# Patient Record
Sex: FEMALE | Race: WHITE | NOT HISPANIC OR LATINO | RURAL
[De-identification: names, ages, dates, MRNs, and addresses within clinical notes are randomized per-mention and may not be internally consistent; named-entity substitution may affect disease eponyms.]

---

## 2022-02-09 ENCOUNTER — CLAIMS CREATED FROM THE CLAIM WINDOW (OUTPATIENT)
Dept: RURAL CLINIC 8 | Facility: CLINIC | Age: 52
End: 2022-02-09
Payer: MEDICARE

## 2022-02-09 ENCOUNTER — OFFICE VISIT (OUTPATIENT)
Dept: RURAL CLINIC 8 | Facility: CLINIC | Age: 52
End: 2022-02-09
Payer: MEDICARE

## 2022-02-09 DIAGNOSIS — F11.90 CHRONIC NARCOTIC USE: ICD-10-CM

## 2022-02-09 DIAGNOSIS — K59.09 CHRONIC CONSTIPATION: ICD-10-CM

## 2022-02-09 DIAGNOSIS — R10.9 ABDOMINAL CRAMPING: ICD-10-CM

## 2022-02-09 DIAGNOSIS — Z72.0 TOBACCO ABUSE: ICD-10-CM

## 2022-02-09 DIAGNOSIS — I10 HTN (HYPERTENSION), BENIGN: ICD-10-CM

## 2022-02-09 DIAGNOSIS — G89.29 OTHER CHRONIC PAIN: ICD-10-CM

## 2022-02-09 DIAGNOSIS — Z98.890 HISTORY OF BLADDER SURGERY: ICD-10-CM

## 2022-02-09 DIAGNOSIS — E78.2 MIXED HYPERLIPIDEMIA: ICD-10-CM

## 2022-02-09 DIAGNOSIS — M54.9 DORSALGIA, UNSPECIFIED: ICD-10-CM

## 2022-02-09 PROBLEM — 267434003: Status: ACTIVE | Noted: 2022-02-09

## 2022-02-09 PROBLEM — 10725009: Status: ACTIVE | Noted: 2022-02-09

## 2022-02-09 PROBLEM — 82423001: Status: ACTIVE | Noted: 2022-02-09

## 2022-02-09 PROCEDURE — 99204 OFFICE O/P NEW MOD 45 MIN: CPT | Performed by: INTERNAL MEDICINE

## 2022-02-09 RX ORDER — TOPIRAMATE 100 MG/1
1 TABLET TABLET, FILM COATED ORAL ONCE A DAY
Status: DISCONTINUED | COMMUNITY

## 2022-02-09 RX ORDER — SEMAGLUTIDE 1.34 MG/ML
AS DIRECTED INJECTION, SOLUTION SUBCUTANEOUS
Status: ACTIVE | COMMUNITY

## 2022-02-09 RX ORDER — VILAZODONE HYDROCHLORIDE 40 MG/1
1 TABLET WITH FOOD TABLET ORAL ONCE A DAY
Status: ACTIVE | COMMUNITY

## 2022-02-09 RX ORDER — ATORVASTATIN CALCIUM 40 MG/1
1 TABLET TABLET, FILM COATED ORAL ONCE A DAY
Status: ACTIVE | COMMUNITY

## 2022-02-09 RX ORDER — BUTALBITAL, ACETAMINOPHEN AND CAFFEINE 50; 325; 40 MG/1; MG/1; MG/1
1 CAPSULE AS NEEDED CAPSULE ORAL
Status: ACTIVE | COMMUNITY

## 2022-02-09 RX ORDER — LEVOTHYROXINE SODIUM 0.05 MG/1
1 TABLET IN THE MORNING ON AN EMPTY STOMACH TABLET ORAL ONCE A DAY
Status: ACTIVE | COMMUNITY

## 2022-02-09 RX ORDER — PROPRANOLOL HYDROCHLORIDE 20 MG/1
1 TABLET TABLET ORAL ONCE A DAY
Status: ACTIVE | COMMUNITY

## 2022-02-09 RX ORDER — ONDANSETRON HYDROCHLORIDE 4 MG/1
1 TABLET TABLET, FILM COATED ORAL ONCE A DAY
Status: ACTIVE | COMMUNITY

## 2022-02-09 RX ORDER — CELECOXIB 100 MG/1
1 CAPSULE WITH FOOD CAPSULE ORAL ONCE A DAY
Status: ACTIVE | COMMUNITY

## 2022-02-09 RX ORDER — ALPRAZOLAM 0.5 MG/1
1 TABLET TABLET ORAL TWICE A DAY
Status: ACTIVE | COMMUNITY

## 2022-02-09 RX ORDER — ESTRADIOL 1 MG/1
1 TABLET TABLET ORAL ONCE A DAY
Status: ACTIVE | COMMUNITY

## 2022-02-09 RX ORDER — LACTULOSE 10 G/15ML
15 ML SOLUTION ORAL ONCE A DAY
Status: ACTIVE | COMMUNITY

## 2022-02-09 RX ORDER — POTASSIUM CHLORIDE 1500 MG/1
1 TABLET WITH FOOD TABLET, FILM COATED, EXTENDED RELEASE ORAL ONCE A DAY
Status: ACTIVE | COMMUNITY

## 2022-02-09 RX ORDER — IBUPROFEN 800 MG/1
1 TABLET WITH FOOD OR MILK AS NEEDED TABLET ORAL THREE TIMES A DAY
Status: DISCONTINUED | COMMUNITY

## 2022-02-09 RX ORDER — NYSTATIN 100000 [USP'U]/G
1 APPLICATION POWDER TOPICAL TWICE A DAY
Status: DISCONTINUED | COMMUNITY

## 2022-02-09 RX ORDER — GABAPENTIN 600 MG/1
1 TABLET TABLET, FILM COATED ORAL ONCE A DAY
Status: ACTIVE | COMMUNITY

## 2022-02-09 RX ORDER — BUPRENORPHINE HCL 8 MG/1
1 TABLET UNDER THE TONGUE AND ALLOW TO DISSOLVE TABLET SUBLINGUAL ONCE A DAY
Status: ACTIVE | COMMUNITY

## 2022-02-09 RX ORDER — LINACLOTIDE 290 UG/1
1 CAPSULE AT LEAST 30 MINUTES BEFORE THE FIRST MEAL OF THE DAY ON AN EMPTY STOMACH CAPSULE, GELATIN COATED ORAL ONCE A DAY
Status: ACTIVE | COMMUNITY

## 2022-02-09 RX ORDER — MODAFINIL 100 MG/1
1 TABLET IN THE MORNING TABLET ORAL ONCE A DAY
Status: ACTIVE | COMMUNITY

## 2022-02-09 RX ORDER — ALBUTEROL SULFATE 90 UG/1
1 PUFF AS NEEDED AEROSOL, METERED RESPIRATORY (INHALATION)
Status: ACTIVE | COMMUNITY

## 2022-02-09 RX ORDER — BISACODYL 5 MG/1
3 TABLETS TABLET, DELAYED RELEASE ORAL ONCE A DAY
Qty: 90 TABLET | Refills: 5 | OUTPATIENT

## 2022-02-09 RX ORDER — POLYETHYLENE GLYCOL 3350 17 G
1 PACKET MIXED WITH 8 OUNCES OF FLUID POWDER IN PACKET (EA) ORAL ONCE A DAY
Qty: 90 | Refills: 3 | OUTPATIENT

## 2022-02-09 NOTE — HPI-TODAY'S VISIT:
Patient comes in referral from Dr. Caceres.  A copy of this consultation will be sent to the referring physician.  She has history of opiate abuse disorder and currently is on Suboxone.  She says that she has been constipated for longer than she could remember.  She has tried multiple things like laxatives and Senokot Ex-Lax also been on Relistor in the past currently on linzess  but not having a bit a luck with that also been on lactulose. - She tells me that she has to put a glove on and dig the stool out of her rectum. she never gets the sensation that she needs to have a bowel movement. she reports that there feels like there is brick in her rectum. she never passes blood in the stool.  - Pt has never had a colonoscopy. no FH of colon cancer. Pt is on subutex and has bad back pain from her surgeries. was on opiates for many years.  She has tried relistor but it did not help. she has tried Movantik and it did not  help. - She has not tried Motegrity.

## 2022-03-07 ENCOUNTER — DASHBOARD ENCOUNTERS (OUTPATIENT)
Age: 52
End: 2022-03-07

## 2022-04-13 ENCOUNTER — OFFICE VISIT (OUTPATIENT)
Dept: RURAL CLINIC 8 | Facility: CLINIC | Age: 52
End: 2022-04-13

## 2022-04-13 RX ORDER — SEMAGLUTIDE 1.34 MG/ML
AS DIRECTED INJECTION, SOLUTION SUBCUTANEOUS
COMMUNITY

## 2022-04-13 RX ORDER — CELECOXIB 100 MG/1
1 CAPSULE WITH FOOD CAPSULE ORAL ONCE A DAY
COMMUNITY

## 2022-04-13 RX ORDER — GABAPENTIN 600 MG/1
1 TABLET TABLET, FILM COATED ORAL ONCE A DAY
COMMUNITY

## 2022-04-13 RX ORDER — BISACODYL 5 MG/1
3 TABLETS TABLET, DELAYED RELEASE ORAL ONCE A DAY
Qty: 90 TABLET | Refills: 5 | COMMUNITY

## 2022-04-13 RX ORDER — VILAZODONE HYDROCHLORIDE 40 MG/1
1 TABLET WITH FOOD TABLET ORAL ONCE A DAY
COMMUNITY

## 2022-04-13 RX ORDER — MODAFINIL 100 MG/1
1 TABLET IN THE MORNING TABLET ORAL ONCE A DAY
COMMUNITY

## 2022-04-13 RX ORDER — BUTALBITAL, ACETAMINOPHEN AND CAFFEINE 50; 325; 40 MG/1; MG/1; MG/1
1 CAPSULE AS NEEDED CAPSULE ORAL
COMMUNITY

## 2022-04-13 RX ORDER — POLYETHYLENE GLYCOL 3350 17 G
1 PACKET MIXED WITH 8 OUNCES OF FLUID POWDER IN PACKET (EA) ORAL ONCE A DAY
Qty: 90 | Refills: 3 | COMMUNITY

## 2022-04-13 RX ORDER — LACTULOSE 10 G/15ML
15 ML SOLUTION ORAL ONCE A DAY
COMMUNITY

## 2022-04-13 RX ORDER — POTASSIUM CHLORIDE 1500 MG/1
1 TABLET WITH FOOD TABLET, FILM COATED, EXTENDED RELEASE ORAL ONCE A DAY
COMMUNITY

## 2022-04-13 RX ORDER — ATORVASTATIN CALCIUM 40 MG/1
1 TABLET TABLET, FILM COATED ORAL ONCE A DAY
COMMUNITY

## 2022-04-13 RX ORDER — LINACLOTIDE 290 UG/1
1 CAPSULE AT LEAST 30 MINUTES BEFORE THE FIRST MEAL OF THE DAY ON AN EMPTY STOMACH CAPSULE, GELATIN COATED ORAL ONCE A DAY
COMMUNITY

## 2022-04-13 RX ORDER — ESTRADIOL 1 MG/1
1 TABLET TABLET ORAL ONCE A DAY
COMMUNITY

## 2022-04-13 RX ORDER — PROPRANOLOL HYDROCHLORIDE 20 MG/1
1 TABLET TABLET ORAL ONCE A DAY
COMMUNITY

## 2022-04-13 RX ORDER — ONDANSETRON HYDROCHLORIDE 4 MG/1
1 TABLET TABLET, FILM COATED ORAL ONCE A DAY
COMMUNITY

## 2022-04-13 RX ORDER — ALPRAZOLAM 0.5 MG/1
1 TABLET TABLET ORAL TWICE A DAY
COMMUNITY

## 2022-04-13 RX ORDER — LEVOTHYROXINE SODIUM 0.05 MG/1
1 TABLET IN THE MORNING ON AN EMPTY STOMACH TABLET ORAL ONCE A DAY
COMMUNITY

## 2022-04-13 RX ORDER — ALBUTEROL SULFATE 90 UG/1
1 PUFF AS NEEDED AEROSOL, METERED RESPIRATORY (INHALATION)
COMMUNITY

## 2022-04-13 RX ORDER — BUPRENORPHINE HCL 8 MG/1
1 TABLET UNDER THE TONGUE AND ALLOW TO DISSOLVE TABLET SUBLINGUAL ONCE A DAY
COMMUNITY

## 2022-04-13 NOTE — HPI-TODAY'S VISIT:
Patient comes in referral from Dr. Caceres.  A copy of this consultation will be sent to the referring physician.  She has history of opiate abuse disorder and currently is on Suboxone.  She says that she has been constipated for longer than she could remember.  She has tried multiple things like laxatives and Senokot Ex-Lax also been on Relistor in the past currently on linzess  but not having a bit a luck with that also been on lactulose. - She tells me that she has to put a glove on and dig the stool out of her rectum. she never gets the sensation that she needs to have a bowel movement. she reports that there feels like there is brick in her rectum. she never passes blood in the stool.  - Pt has never had a colonoscopy. no FH of colon cancer. Pt is on subutex and has bad back pain from her surgeries. was on opiates for many years.  She has tried relistor but it did not help. she has tried Movantik and it did not  help. - She has not tried Motegrity. -   04/13/2022: At her last visit we discussed her essentially lifelong constipation and all of the things that she had tried forit particularly lactulose and Linzess and Relistor and Movantik.  These medications proved to be quite expensive for her to get.  At her last visit I recommended that she start Dulcolax 15 mg every day and MiraLax once a day

## 2022-07-09 ENCOUNTER — TELEPHONE ENCOUNTER (OUTPATIENT)
Dept: URBAN - METROPOLITAN AREA CLINIC 121 | Facility: CLINIC | Age: 52
End: 2022-07-09

## 2022-07-09 RX ORDER — LORATADINE 5 MG
17G MIXED QID TABLET,CHEWABLE ORAL
Refills: 11 | OUTPATIENT
Start: 2005-12-09 | End: 2005-12-09

## 2022-07-10 ENCOUNTER — TELEPHONE ENCOUNTER (OUTPATIENT)
Dept: URBAN - METROPOLITAN AREA CLINIC 121 | Facility: CLINIC | Age: 52
End: 2022-07-10

## 2022-07-10 RX ORDER — LORATADINE 5 MG
17G MIXED QID TABLET,CHEWABLE ORAL
Refills: 11 | Status: ACTIVE | COMMUNITY
Start: 2005-12-09